# Patient Record
Sex: FEMALE | Employment: FULL TIME | URBAN - METROPOLITAN AREA
[De-identification: names, ages, dates, MRNs, and addresses within clinical notes are randomized per-mention and may not be internally consistent; named-entity substitution may affect disease eponyms.]

---

## 2023-02-21 ENCOUNTER — OFFICE VISIT (OUTPATIENT)
Dept: OBGYN CLINIC | Facility: CLINIC | Age: 43
End: 2023-02-21

## 2023-02-21 VITALS
WEIGHT: 147 LBS | BODY MASS INDEX: 26.05 KG/M2 | DIASTOLIC BLOOD PRESSURE: 70 MMHG | HEIGHT: 63 IN | SYSTOLIC BLOOD PRESSURE: 100 MMHG

## 2023-02-21 DIAGNOSIS — Z30.09 FAMILY PLANNING: Primary | ICD-10-CM

## 2023-02-21 PROCEDURE — 99202 OFFICE O/P NEW SF 15 MIN: CPT | Performed by: OBSTETRICS & GYNECOLOGY

## 2023-02-21 RX ORDER — MULTIVITAMIN
1 TABLET ORAL DAILY
COMMUNITY

## 2023-10-10 ENCOUNTER — TELEPHONE (OUTPATIENT)
Dept: OBGYN CLINIC | Facility: CLINIC | Age: 43
End: 2023-10-10

## 2023-10-10 NOTE — TELEPHONE ENCOUNTER
Patient is calling requesting to know the price of having IUD mirena insertion due to patient is self-pay. Please follow up with patient.

## 2023-10-11 NOTE — TELEPHONE ENCOUNTER
RN contacted pt, provided estimate for mirena iud insertion and office visit. Pt agrees, appt schedule with  11/10 at 1000.

## 2023-11-01 ENCOUNTER — TELEPHONE (OUTPATIENT)
Dept: OBGYN CLINIC | Facility: CLINIC | Age: 43
End: 2023-11-01

## 2023-11-01 NOTE — TELEPHONE ENCOUNTER
RN spoke to pt. Pt wants to know what the cost is for IUD removal just in case the IUD doesn't agreed with her. Pt took a Plan B at the beginning of October, and she feels like she was very mendez and had low energy and acne for weeks. She says that those symptoms have improved and she is mostly back to feeling like her normal self. Pt is wondering what the common complaints are re: IUDs. RN told pt that some people have spotting and irregular bleeding for a few months as their bodies adjust to the hormones. RN told pt that RN would look into the cost of IUD removal (pt is self-pay) and would get back to her. Pt verbalized understanding and agreed with POC.

## 2023-11-01 NOTE — TELEPHONE ENCOUNTER
Contacted [facility] Customer Service at [phone] - spoke to Betty Macario - who stated that cost of IUD Removal (CPT 41248) is estimated at $255. Patient is aware that this is only a ball park figure and not an EXACT cost. Costs could differ after billing cycle. Patient was also made aware of $131.25 self-pay amount as an established patient would be collected at the time of service. Patient is also aware that if services rendered cost more than initial self-pay amount, she may either pay the difference - or- pay as an entirely separate balance depending on how services are bill. Patient understood and agreed.

## 2023-11-01 NOTE — TELEPHONE ENCOUNTER
RN spoke to pt and answered additional questions about IUDs. RN also provided pt with [id number] so that she can look into lower-cost options. RN told pt to call the office if she has additional questions or concerns. Pt verbalized understanding and agreed with POC.

## 2023-11-08 ENCOUNTER — TELEPHONE (OUTPATIENT)
Dept: OBGYN CLINIC | Facility: CLINIC | Age: 43
End: 2023-11-08

## 2023-11-08 ENCOUNTER — OFFICE VISIT (OUTPATIENT)
Dept: OBGYN CLINIC | Facility: CLINIC | Age: 43
End: 2023-11-08

## 2023-11-08 VITALS
BODY MASS INDEX: 25.16 KG/M2 | HEIGHT: 63 IN | DIASTOLIC BLOOD PRESSURE: 60 MMHG | WEIGHT: 142 LBS | SYSTOLIC BLOOD PRESSURE: 92 MMHG

## 2023-11-08 DIAGNOSIS — Z30.430 ENCOUNTER FOR INSERTION OF INTRAUTERINE CONTRACEPTIVE DEVICE (IUD): ICD-10-CM

## 2023-11-08 DIAGNOSIS — Z12.4 SCREENING FOR CERVICAL CANCER: Primary | ICD-10-CM

## 2023-11-08 DIAGNOSIS — Z30.430 ENCOUNTER FOR IUD INSERTION: ICD-10-CM

## 2023-11-08 LAB
CONTROL LINE PRESENT WITH A CLEAR BACKGROUND (YES/NO): YES YES/NO
KIT EXPIRATION DATE: NORMAL DATE
KIT LOT #: NORMAL NUMERIC
PREGNANCY TEST, URINE: NEGATIVE

## 2023-11-08 RX ORDER — CHOLECALCIFEROL (VITAMIN D3) 50 MCG
TABLET ORAL
COMMUNITY

## 2023-11-08 NOTE — TELEPHONE ENCOUNTER
Pt is calling requesting to speak with a RN with questions on if she can work out after getting a IUD. Please assist

## 2023-11-08 NOTE — TELEPHONE ENCOUNTER
Called pt informed fine to workout if able but pt feeling cramping therefore informed to rest.  Informed also to take ibuprofen 600 mg every 6 to 8 hours as needed in between Tylenol 1,000 mg. Pt agrees.

## 2023-11-08 NOTE — PROGRESS NOTES
IUD Insertion     Pregnancy Results: UCG negative    LMP:     Consent signed. Procedure discussed with the patient in detail including indication, risks, benefits, alternatives and complications. I discussed with the patient the procedure. I discussed the normal SE of break through vaginal bleeding which could last up to 90 days as well as some short term lower abdominal cramping. I discussed with her to call me if she noticed worsening lower abdominal pain, fever, chills, nausea, vomiting, or a foul vaginal discharge. I also discussed with her how to feel for the IUD strings in her vagina to insure that the IUD had not been expelled. Procedure:  Speculum placed in the vagina. Betadine wash of vagina and cervix. Single tooth tenaculum was placed at the 12 o'clock position. Uterus sounded to 8 cm. Mirena IUD was placed without difficulty. Strings cut at 3 cm from the os. Single tooth tenaculum removed. Good hemostasis noted. Patient tolerated procedure well. Visit Plan:  IUD surveillance was discussed with the patient as above. Written info from the IUD packaging also was given to the patient at the completion of the procedure.

## 2023-11-09 ENCOUNTER — TELEPHONE (OUTPATIENT)
Dept: OBGYN CLINIC | Facility: CLINIC | Age: 43
End: 2023-11-09

## 2023-12-06 ENCOUNTER — TELEPHONE (OUTPATIENT)
Dept: OBGYN CLINIC | Facility: CLINIC | Age: 43
End: 2023-12-06

## 2023-12-06 NOTE — TELEPHONE ENCOUNTER
Patient is calling requesting to speak to RN regarding symptoms patient is having after having IUD placement. Please follow up with patient.

## 2023-12-06 NOTE — TELEPHONE ENCOUNTER
Called pt c/o bleeding x 10 days purple to red. IUD precautions given and scheduled string check for 12/13/2023. Pt agrees. Last visit 11/08/2023 [facility] [place]  Visit Plan:  IUD surveillance was discussed with the patient as above. Written info from the IUD packaging also was given to the patient at the completion of the procedure.

## 2023-12-08 ENCOUNTER — TELEPHONE (OUTPATIENT)
Dept: OBGYN CLINIC | Facility: CLINIC | Age: 43
End: 2023-12-08

## 2023-12-13 ENCOUNTER — OFFICE VISIT (OUTPATIENT)
Dept: OBGYN CLINIC | Facility: CLINIC | Age: 43
End: 2023-12-13

## 2023-12-13 VITALS — DIASTOLIC BLOOD PRESSURE: 60 MMHG | HEIGHT: 63 IN | SYSTOLIC BLOOD PRESSURE: 100 MMHG | BODY MASS INDEX: 25 KG/M2

## 2023-12-13 DIAGNOSIS — Z30.431 IUD CHECK UP: ICD-10-CM

## 2023-12-13 DIAGNOSIS — Z12.4 SCREENING FOR CERVICAL CANCER: Primary | ICD-10-CM

## 2023-12-13 PROCEDURE — 99396 PREV VISIT EST AGE 40-64: CPT | Performed by: OBSTETRICS & GYNECOLOGY

## 2023-12-13 PROCEDURE — 87624 HPV HI-RISK TYP POOLED RSLT: CPT | Performed by: OBSTETRICS & GYNECOLOGY

## 2023-12-13 RX ORDER — LEVONORGESTREL 52 MG/1
1 INTRAUTERINE DEVICE INTRAUTERINE ONCE
COMMUNITY

## 2023-12-14 LAB — HPV I/H RISK 1 DNA SPEC QL NAA+PROBE: POSITIVE

## 2023-12-19 PROBLEM — R87.810 CERVICAL HIGH RISK HUMAN PAPILLOMAVIRUS (HPV) DNA TEST POSITIVE: Status: ACTIVE | Noted: 2023-12-19

## 2024-01-18 ENCOUNTER — TELEPHONE (OUTPATIENT)
Dept: OBGYN CLINIC | Facility: CLINIC | Age: 44
End: 2024-01-18

## 2024-01-18 DIAGNOSIS — B37.9 YEAST INFECTION: Primary | ICD-10-CM

## 2024-01-18 RX ORDER — FLUCONAZOLE 150 MG/1
TABLET ORAL
Qty: 2 TABLET | Refills: 0 | Status: SHIPPED | OUTPATIENT
Start: 2024-01-18

## 2024-01-18 NOTE — TELEPHONE ENCOUNTER
RN spoke with pt. Pt says that she has had intermittent clumpy discharge and vaginal itching. Pt says that the symptoms resolved for a little while, and then returned on 1/16. Pt c/o vulva sensitivity and itching inside her vagina. RN told pt that RN would send Diflucan x2 to her preferred pharmacy (verified) and provided admin instructions. Pt is sked to have her IUD removed next week d/t migraines, frequent HA, constant breast tenderness. RN told pt that if the second dose of Diflucan didn't fix her problem, she should discuss the issue with LC at her upcoming appt. Pt verbalized understanding and agreed with POC.

## 2024-01-18 NOTE — TELEPHONE ENCOUNTER
Incoming call from patient requesting a RN to call her back for possible yeast infection .    Please assist

## 2024-01-24 ENCOUNTER — OFFICE VISIT (OUTPATIENT)
Dept: OBGYN CLINIC | Facility: CLINIC | Age: 44
End: 2024-01-24

## 2024-01-24 VITALS
SYSTOLIC BLOOD PRESSURE: 108 MMHG | WEIGHT: 142.31 LBS | HEIGHT: 63 IN | BODY MASS INDEX: 25.21 KG/M2 | DIASTOLIC BLOOD PRESSURE: 70 MMHG

## 2024-01-24 DIAGNOSIS — Z30.432 ENCOUNTER FOR REMOVAL OF INTRAUTERINE CONTRACEPTIVE DEVICE: Primary | ICD-10-CM

## 2024-01-24 PROCEDURE — 58301 REMOVE INTRAUTERINE DEVICE: CPT | Performed by: OBSTETRICS & GYNECOLOGY

## 2024-01-24 PROCEDURE — 99213 OFFICE O/P EST LOW 20 MIN: CPT | Performed by: OBSTETRICS & GYNECOLOGY

## 2024-01-24 RX ORDER — LEVONORGESTREL AND ETHINYL ESTRADIOL 0.1-0.02MG
1 KIT ORAL DAILY
Qty: 84 TABLET | Refills: 3 | Status: SHIPPED | OUTPATIENT
Start: 2024-01-24 | End: 2025-01-23

## 2024-01-24 NOTE — PROGRESS NOTES
CC: Patient is here for IUD removal.     HPI: Patient is a 43 year old  for IUD removal. Had IUD placed 2023. She does not like b/c of persistent bleeding, worsening HA.     She has used OCP's before without problems. She would like to restart OCPs        Patient's last menstrual period was 2023.    OB History    Para Term  AB Living   3 2 2   1 2   SAB IAB Ectopic Multiple Live Births           2      # Outcome Date GA Lbr Alfredo/2nd Weight Sex Delivery Anes PTL Lv   3 Term 2012   7 lb (3.175 kg) F NORMAL SPONT   [facility]A      Complications: Hemorrhage   2 AB 2004              Birth Comments: Early 1st trimester   1 Term 10/04/01 40w2d 05:00 7 lb 13 oz (3.544 kg) M Vag-Spont   CARMENCITA       GYN hx:       LPS:      History reviewed. No pertinent past medical history.  History reviewed. No pertinent surgical history.  Allergies   Allergen Reactions    Latex HIVES     Swelling per pt.     Family History   Problem Relation Age of Onset    High Blood Pressure Mother     Stroke Mother     Heart Disease Mother     No Known Problems Father     Kidney Disease Maternal Grandmother     Stroke Maternal Grandfather     Heart Disease Maternal Grandfather     Dementia Maternal Grandfather     Kidney Disease Paternal Grandmother     Diabetes Paternal Grandmother     Breast Cancer Neg     Ovarian Cancer Neg     Colon Cancer Neg      Social History     Socioeconomic History    Marital status: Single     Spouse name: Not on file    Number of children: Not on file    Years of education: Not on file    Highest education level: Not on file   Occupational History    Occupation: Accounting   Tobacco Use    Smoking status: Former     Types: Cigarettes    Smokeless tobacco: Former   Vaping Use    Vaping Use: Never used   Substance and Sexual Activity    Alcohol use: Yes     Comment: Soc.    Drug use: Never    Sexual activity: Yes     Partners: Male     Birth control/protection: Condom   Other Topics Concern      Service Not Asked    Blood Transfusions No    Caffeine Concern Not Asked    Occupational Exposure Not Asked    Hobby Hazards Not Asked    Sleep Concern Not Asked    Stress Concern Not Asked    Weight Concern Not Asked    Special Diet Not Asked    Back Care Not Asked    Exercise Not Asked    Bike Helmet Not Asked    Seat Belt Not Asked    Self-Exams Not Asked   Social History Narrative    Live with children    Feels safe    No hx of abuse     Social Determinants of Health     Financial Resource Strain: Not on file   Food Insecurity: Not on file   Transportation Needs: Not on file   Physical Activity: Not on file   Stress: Not on file   Social Connections: Not on file   Housing Stability: Not on file       Medications reviewed. See active list.     /70   Ht 63\"   Wt 142 lb 4.8 oz (64.5 kg)   LMP 11/30/2023   BMI 25.21 kg/m²       Exam:   GENERAL: well developed, well nourished, in no apparent distress  ABDOMEN: Soft, non distended; non tender, no masses.  Liver and spleen non-tender, no enlargement. No palpable hernias  GYNE/:  External Genitalia: Normal appearing, no lesions, normal hair distribution   Urethral meatus appear wnl, no abnormal discharge or lesions noted.   Bladder: well supported, urethra wnl, no palpable tenderness or masses, no discharge  Vagina: normal pink mucosa, no lesions, normal clear discharge.   Uterus: midline, mobile, non-tender, firm and smooth  Cervix: pink, no lesions grossly visible, no discharge  Adnexa: non tender, no palpable masses, normal size  Anus:  No lesions or visible hemorrhoids    IUD Removal     Consent signed.    Procedure discussed with the patient in detail including indication, risks, benefits, alternatives and complications.    Pelvic Exam Findings:  Lesion description:  IUD strings seen from cervix    Procedure:  Speculum placed in the vagina.  Speculum was used to visualize strings.  An packing forceps clamp used to grasp IUD strings.  Mirena IUD  was removed without difficulty.  The patient tolerated the procedure well.      Visit Plan:  Discharge instructions were reviewed with the patient.    A/P: Patient is 43 year old female     1. Encounter for removal of intrauterine contraceptive device  - Removal of IUD [86400]    Prior to removal I dw pt that BTB was normal for the first 90 days.   RX sent in for OCP's.     Haydee Chanel MD

## 2024-02-01 ENCOUNTER — LAB ENCOUNTER (OUTPATIENT)
Dept: LAB | Facility: REFERENCE LAB | Age: 44
End: 2024-02-01
Attending: OBSTETRICS & GYNECOLOGY

## 2024-02-01 ENCOUNTER — TELEPHONE (OUTPATIENT)
Dept: OBGYN CLINIC | Facility: CLINIC | Age: 44
End: 2024-02-01

## 2024-02-01 ENCOUNTER — NURSE ONLY (OUTPATIENT)
Dept: OBGYN CLINIC | Facility: CLINIC | Age: 44
End: 2024-02-01

## 2024-02-01 DIAGNOSIS — N39.0 URINARY TRACT INFECTION WITHOUT HEMATURIA, SITE UNSPECIFIED: ICD-10-CM

## 2024-02-01 DIAGNOSIS — Z11.3 SCREENING EXAMINATION FOR STD (SEXUALLY TRANSMITTED DISEASE): Primary | ICD-10-CM

## 2024-02-01 DIAGNOSIS — Z11.3 ROUTINE SCREENING FOR STI (SEXUALLY TRANSMITTED INFECTION): Primary | ICD-10-CM

## 2024-02-01 DIAGNOSIS — Z11.3 SCREENING EXAMINATION FOR STD (SEXUALLY TRANSMITTED DISEASE): ICD-10-CM

## 2024-02-01 LAB
BILIRUBIN: NEGATIVE
GLUCOSE (URINE DIPSTICK): NEGATIVE MG/DL
KETONES (URINE DIPSTICK): NEGATIVE MG/DL
MULTISTIX LOT#: ABNORMAL NUMERIC
NITRITE, URINE: POSITIVE
PH, URINE: 7 (ref 4.5–8)
PROTEIN (URINE DIPSTICK): NEGATIVE MG/DL
SPECIFIC GRAVITY: 1.01 (ref 1–1.03)
URINE-COLOR: YELLOW
UROBILINOGEN,SEMI-QN: 0.2 MG/DL (ref 0–1.9)

## 2024-02-01 PROCEDURE — 87491 CHLMYD TRACH DNA AMP PROBE: CPT | Performed by: OBSTETRICS & GYNECOLOGY

## 2024-02-01 PROCEDURE — 87591 N.GONORRHOEAE DNA AMP PROB: CPT | Performed by: OBSTETRICS & GYNECOLOGY

## 2024-02-01 PROCEDURE — 87186 SC STD MICRODIL/AGAR DIL: CPT | Performed by: OBSTETRICS & GYNECOLOGY

## 2024-02-01 PROCEDURE — 87389 HIV-1 AG W/HIV-1&-2 AB AG IA: CPT

## 2024-02-01 PROCEDURE — 81003 URINALYSIS AUTO W/O SCOPE: CPT | Performed by: OBSTETRICS & GYNECOLOGY

## 2024-02-01 PROCEDURE — 87088 URINE BACTERIA CULTURE: CPT | Performed by: OBSTETRICS & GYNECOLOGY

## 2024-02-01 PROCEDURE — 36415 COLL VENOUS BLD VENIPUNCTURE: CPT

## 2024-02-01 PROCEDURE — 87086 URINE CULTURE/COLONY COUNT: CPT | Performed by: OBSTETRICS & GYNECOLOGY

## 2024-02-01 PROCEDURE — 86803 HEPATITIS C AB TEST: CPT

## 2024-02-01 PROCEDURE — 86780 TREPONEMA PALLIDUM: CPT

## 2024-02-01 RX ORDER — NITROFURANTOIN 25; 75 MG/1; MG/1
100 CAPSULE ORAL 2 TIMES DAILY
Qty: 14 CAPSULE | Refills: 0 | Status: SHIPPED | OUTPATIENT
Start: 2024-02-01 | End: 2024-02-08

## 2024-02-01 NOTE — TELEPHONE ENCOUNTER
Patient is calling requesting to speak to RN regarding a medication. Per patient didn't want to stated the question just requesting a call back. Please assist.

## 2024-02-01 NOTE — TELEPHONE ENCOUNTER
RN contacted pt. Pt reports s/s of UTI that have worsened. Pt also reports recently finding out that her previous partner was sexually active with others and is requesting testing.       Labs placed. Nurse visit for UA/UC and gc/chlamydia today

## 2024-02-02 LAB
C TRACH DNA SPEC QL NAA+PROBE: NEGATIVE
HCV AB SERPL QL IA: NONREACTIVE
N GONORRHOEA DNA SPEC QL NAA+PROBE: NEGATIVE
T PALLIDUM AB SER QL IA: NONREACTIVE

## 2024-03-11 ENCOUNTER — TELEPHONE (OUTPATIENT)
Dept: OBGYN CLINIC | Facility: CLINIC | Age: 44
End: 2024-03-11

## 2024-03-11 NOTE — TELEPHONE ENCOUNTER
Incoming call from patient requesting to speak with a nurse for a possible UTI .    Patient didn't want to disclose more information to this PSR .    Please assist .

## 2024-03-11 NOTE — TELEPHONE ENCOUNTER
Called pt c/o UTI, +UTI went to clinic on Sunday given Amoxicillin.  Per pt pain in back, left side with chills, informed it was good she was seen due to possible kidney infection.  Informed antibiotic is fine and pending culture results on Wednesday.  Pt would like to follow-up with LC due to recurrent UTIs, scheduled 03/26/2024.  Pt agrees.

## 2024-03-27 ENCOUNTER — TELEPHONE (OUTPATIENT)
Dept: OBGYN CLINIC | Facility: CLINIC | Age: 44
End: 2024-03-27

## 2024-11-20 ENCOUNTER — TELEPHONE (OUTPATIENT)
Dept: OBGYN CLINIC | Facility: CLINIC | Age: 44
End: 2024-11-20

## 2024-11-20 NOTE — TELEPHONE ENCOUNTER
RN spoke with pt. Dr. Owens prescribed Lutera OCPs for the pt in Jan. 2024. Pt never started the medication. She is now planning on starting and read the supplemental material inside the package. She is concerned about taking the medication d/t family h/o stroke and blood clots. She says all her maternal family members have heart disease. Pt herself has no h/o of stroke or clots. Pt does not smoke cigarettes, but she smokes marijuana recreationally. RN told pt RN would speak to a provider and would call her back. Pt verbalized understanding and agreed with plan of care.

## 2024-11-20 NOTE — TELEPHONE ENCOUNTER
Patient is calling requesting to speak to RN regarding birthcontrol medication patient did not state more information. Please follow up with patient.     Levonorgestrel-Ethinyl Estrad (LUTERA) 0.1-20 MG-MCG Oral Tab

## 2024-11-20 NOTE — TELEPHONE ENCOUNTER
RN spoke with pt and told her that RN would forward her message to Dr. Owens. RN told pt that Dr. Owens is on call today, but she will be back in the office on Friday. Pt verbalized understanding and agreed with plan of care.

## 2024-11-22 NOTE — TELEPHONE ENCOUNTER
RN spoke with pt and told her that Dr. Owens says she can safely take Lutera. Pt verbalized understanding and agreed with plan of care.

## 2024-12-24 ENCOUNTER — OFFICE VISIT (OUTPATIENT)
Dept: OBGYN CLINIC | Facility: CLINIC | Age: 44
End: 2024-12-24
Payer: MEDICAID

## 2024-12-24 VITALS — DIASTOLIC BLOOD PRESSURE: 76 MMHG | BODY MASS INDEX: 25 KG/M2 | SYSTOLIC BLOOD PRESSURE: 108 MMHG | HEIGHT: 63 IN

## 2024-12-24 DIAGNOSIS — Z11.3 SCREEN FOR STD (SEXUALLY TRANSMITTED DISEASE): ICD-10-CM

## 2024-12-24 DIAGNOSIS — R87.810 CERVICAL HIGH RISK HUMAN PAPILLOMAVIRUS (HPV) DNA TEST POSITIVE: ICD-10-CM

## 2024-12-24 DIAGNOSIS — Z12.4 SCREENING FOR CERVICAL CANCER: Primary | ICD-10-CM

## 2024-12-24 PROCEDURE — 87491 CHLMYD TRACH DNA AMP PROBE: CPT | Performed by: OBSTETRICS & GYNECOLOGY

## 2024-12-24 PROCEDURE — 99396 PREV VISIT EST AGE 40-64: CPT | Performed by: OBSTETRICS & GYNECOLOGY

## 2024-12-24 PROCEDURE — 87591 N.GONORRHOEAE DNA AMP PROB: CPT | Performed by: OBSTETRICS & GYNECOLOGY

## 2024-12-24 PROCEDURE — 87624 HPV HI-RISK TYP POOLED RSLT: CPT | Performed by: OBSTETRICS & GYNECOLOGY

## 2024-12-24 PROCEDURE — 88175 CYTOPATH C/V AUTO FLUID REDO: CPT | Performed by: OBSTETRICS & GYNECOLOGY

## 2024-12-24 RX ORDER — ERGOCALCIFEROL 1.25 MG/1
50000 CAPSULE, LIQUID FILLED ORAL WEEKLY
COMMUNITY
Start: 2024-11-15

## 2024-12-24 RX ORDER — NAPROXEN SODIUM 220 MG/1
220 TABLET, FILM COATED ORAL
COMMUNITY
Start: 2024-12-23

## 2024-12-24 NOTE — PROGRESS NOTES
GYN H&P     2024  11:33 AM    CC: Patient is here for annual.     HPI: Patient is a 44 year old  for annual. Needs repeat pap    Menses: once a month, no heavy bleeding or pain. Plans to start Lutera      She would like STI screen      Patient's last menstrual period was 2024 (exact date).    OB History    Para Term  AB Living   3 2 2   1 2   SAB IAB Ectopic Multiple Live Births           2      # Outcome Date GA Lbr Alfredo/2nd Weight Sex Type Anes PTL Lv   3 Term 2012   7 lb (3.175 kg) F NORMAL SPONT   [facility]      Complications: Hemorrhage   2 AB 2004              Birth Comments: Early 1st trimester   1 Term 10/04/01 40w2d 05:00 7 lb 13 oz (3.544 kg) M Vag-Spont   [facility]A       GYN hx:    Hx Prior Abnormal Pap: Yes (per pt had colpo )  Pap Date: 23  Pap Result Notes: HPV+  Follow Up Recommendation: mammo done 2024      History reviewed. No pertinent past medical history.  History reviewed. No pertinent surgical history.  Allergies[1]  Family History   Problem Relation Age of Onset    High Blood Pressure Mother     Stroke Mother     Heart Disease Mother     No Known Problems Father     Kidney Disease Maternal Grandmother     Stroke Maternal Grandfather     Heart Disease Maternal Grandfather     Dementia Maternal Grandfather     Kidney Disease Paternal Grandmother     Diabetes Paternal Grandmother     Breast Cancer Neg     Ovarian Cancer Neg     Colon Cancer Neg      Social History     Socioeconomic History    Marital status: Single   Occupational History    Occupation: Accounting   Tobacco Use    Smoking status: Former     Types: Cigarettes    Smokeless tobacco: Former   Vaping Use    Vaping status: Never Used   Substance and Sexual Activity    Alcohol use: Yes     Comment: Soc.    Drug use: Never    Sexual activity: Yes     Partners: Male     Birth control/protection: Condom     Social History     Social History Narrative    Live with children    Feels safe    No hx of abuse        Medications reviewed. See active list.     /76   Ht 63\"   LMP 12/04/2024 (Exact Date)   BMI 25.21 kg/m²       Exam:   GENERAL: well developed, well nourished, in no apparent distress  SKIN: no rashes, no suspicious lesions  HEENT: normal  NECK: supple; no thyroidmegaly, no adenopathy  BREASTS: symmetrical, nontender, no palpable masses or nodes, no nipple discharge, no skin changes, no dippling, no palpable axillary adenopathy  ABDOMEN: Soft, non distended; non tender, no masses.  Liver and spleen non-tender, no enlargement. No palpable hernias  GYNE/:  External Genitalia: Normal appearing, no lesions, normal hair distribution   Urethral meatus appear wnl, no abnormal discharge or lesions noted.   Bladder: well supported, urethra wnl, no palpable tenderness or masses, no discharge  Vagina: normal pink mucosa, no lesions, normal clear discharge.   Uterus: midline, mobile, non-tender, firm and smooth  Cervix: pink, no lesions grossly visible, no discharge  Adnexa: non tender, no palpable masses, normal size  Anus:  No lesions or visible hemorrhoids        A/P: Patient is 44 year old female     1. Screening for cervical cancer  - ThinPrep PAP Smear; Future  - Hpv Dna  High Risk , Thin Prep Collect; Future    2. Cervical high risk human papillomavirus (HPV) DNA test positive  - ThinPrep PAP Smear; Future  - Hpv Dna  High Risk , Thin Prep Collect; Future    3. Screen for STD (sexually transmitted disease)  - HIV Ag/Ab Combo; Future  - Hepatitis B Surface Antigen; Future  - HCV Antibody; Future  - T Pallidum Screening [place]; Future      Haydee Chanel MD              [1]   Allergies  Allergen Reactions    Latex HIVES     Swelling per pt.

## 2024-12-26 ENCOUNTER — LAB ENCOUNTER (OUTPATIENT)
Dept: LAB | Facility: REFERENCE LAB | Age: 44
End: 2024-12-26
Attending: OBSTETRICS & GYNECOLOGY
Payer: MEDICAID

## 2024-12-26 DIAGNOSIS — Z11.3 SCREEN FOR STD (SEXUALLY TRANSMITTED DISEASE): ICD-10-CM

## 2024-12-26 DIAGNOSIS — Z11.3 SCREENING EXAMINATION FOR STD (SEXUALLY TRANSMITTED DISEASE): ICD-10-CM

## 2024-12-26 LAB
C TRACH DNA SPEC QL NAA+PROBE: NEGATIVE
HBV SURFACE AG SER-ACNC: <0.1 [IU]/L
HBV SURFACE AG SERPL QL IA: NONREACTIVE
HCV AB SERPL QL IA: NONREACTIVE
HPV E6+E7 MRNA CVX QL NAA+PROBE: NEGATIVE
N GONORRHOEA DNA SPEC QL NAA+PROBE: NEGATIVE
T PALLIDUM AB SER QL IA: NONREACTIVE

## 2024-12-26 PROCEDURE — 86803 HEPATITIS C AB TEST: CPT

## 2024-12-26 PROCEDURE — 86780 TREPONEMA PALLIDUM: CPT

## 2024-12-26 PROCEDURE — 87340 HEPATITIS B SURFACE AG IA: CPT

## 2024-12-26 PROCEDURE — 87389 HIV-1 AG W/HIV-1&-2 AB AG IA: CPT

## 2024-12-26 PROCEDURE — 87491 CHLMYD TRACH DNA AMP PROBE: CPT

## 2024-12-26 PROCEDURE — 87591 N.GONORRHOEAE DNA AMP PROB: CPT

## 2024-12-26 PROCEDURE — 36415 COLL VENOUS BLD VENIPUNCTURE: CPT

## 2024-12-27 LAB
C TRACH DNA SPEC QL NAA+PROBE: NEGATIVE
N GONORRHOEA DNA SPEC QL NAA+PROBE: NEGATIVE

## 2025-02-26 ENCOUNTER — OFFICE VISIT (OUTPATIENT)
Dept: OBGYN CLINIC | Facility: CLINIC | Age: 45
End: 2025-02-26
Payer: MEDICAID

## 2025-02-26 VITALS
HEIGHT: 63 IN | DIASTOLIC BLOOD PRESSURE: 70 MMHG | BODY MASS INDEX: 23.92 KG/M2 | SYSTOLIC BLOOD PRESSURE: 118 MMHG | WEIGHT: 135 LBS

## 2025-02-26 DIAGNOSIS — R35.0 URINARY FREQUENCY: ICD-10-CM

## 2025-02-26 DIAGNOSIS — N94.10 DYSPAREUNIA IN FEMALE: Primary | ICD-10-CM

## 2025-02-26 LAB
APPEARANCE: CLEAR
BILIRUBIN: NEGATIVE
GLUCOSE (URINE DIPSTICK): NEGATIVE MG/DL
KETONES (URINE DIPSTICK): 15 MG/DL
LEUKOCYTES: NEGATIVE
MULTISTIX LOT#: ABNORMAL NUMERIC
NITRITE, URINE: NEGATIVE
PH, URINE: 6 (ref 4.5–8)
PROTEIN (URINE DIPSTICK): NEGATIVE MG/DL
SPECIFIC GRAVITY: 1.01 (ref 1–1.03)
URINE-COLOR: YELLOW
UROBILINOGEN,SEMI-QN: 0.2 MG/DL (ref 0–1.9)

## 2025-02-26 PROCEDURE — 87186 SC STD MICRODIL/AGAR DIL: CPT | Performed by: OBSTETRICS & GYNECOLOGY

## 2025-02-26 PROCEDURE — 87077 CULTURE AEROBIC IDENTIFY: CPT | Performed by: OBSTETRICS & GYNECOLOGY

## 2025-02-26 PROCEDURE — 81002 URINALYSIS NONAUTO W/O SCOPE: CPT | Performed by: OBSTETRICS & GYNECOLOGY

## 2025-02-26 PROCEDURE — 87086 URINE CULTURE/COLONY COUNT: CPT | Performed by: OBSTETRICS & GYNECOLOGY

## 2025-02-26 PROCEDURE — 99214 OFFICE O/P EST MOD 30 MIN: CPT | Performed by: OBSTETRICS & GYNECOLOGY

## 2025-02-26 NOTE — PROGRESS NOTES
CC: Patient is here for pelvic pain    HPI: Patient is a 44 year old  for pelvic pain with sex    + c/o pain with sex with some bleeding x 1.5 years,  Pain is upon entry,not deep, not every time with external redness after sex. She does not feel like this is due to lack of lubrication. She tried astroglide with no relief. After she has sex she feels \"pinching sensation\" in her vagina. She will continue to have vaginal pain for up to a week after sex.     She has been having difficulty sleeping at night with some night sweats. No skipped periods    She is using condoms. She was given a prescription for OCP's but has not started them.     Menses: 1 x per month, usually last for 4 days, last period was very heavy, lots of PMS, lasted 7 - 8 days with lower back pain with periods. She has difficulty working out prior to her period.     She does have chronic urinary frequency, thinks she has UTI a lot but does not have one. No pain on urination.     No c/o vaginal discharge.     10/24 pelvic usn @ [place] was normal.       Patient's last menstrual period was 2025 (exact date).    OB History    Para Term  AB Living   3 2 2   1 2   SAB IAB Ectopic Multiple Live Births           2      # Outcome Date GA Lbr Alfredo/2nd Weight Sex Type Anes PTL Lv   3 Term 2012   7 lb (3.175 kg) F NORMAL SPONT   [facility]A      Complications: Hemorrhage   2 AB 2004              Birth Comments: Early 1st trimester   1 Term 10/04/01 40w2d 05:00 7 lb 13 oz (3.544 kg) M Vag-Spont   [facility]A       GYN hx:    Hx Prior Abnormal Pap: Yes (per pt had colpo )  Pap Date: 24  Pap Result Notes: wnl  Follow Up Recommendation: mammo done 2024  LPS:      C    Past Medical History:    Osteoarthritis     No past surgical history on file.  Allergies[1]  Family History   Problem Relation Age of Onset    High Blood Pressure Mother     Stroke Mother     Heart Disease Mother     No Known Problems Father     Kidney Disease Maternal  Grandmother     Stroke Maternal Grandfather     Heart Disease Maternal Grandfather     Dementia Maternal Grandfather     Kidney Disease Paternal Grandmother     Diabetes Paternal Grandmother     Breast Cancer Neg     Ovarian Cancer Neg     Colon Cancer Neg      Social History     Socioeconomic History    Marital status: Single     Spouse name: Not on file    Number of children: Not on file    Years of education: Not on file    Highest education level: Not on file   Occupational History    Occupation: Accounting   Tobacco Use    Smoking status: Former     Types: Cigarettes    Smokeless tobacco: Former   Vaping Use    Vaping status: Never Used   Substance and Sexual Activity    Alcohol use: Yes     Comment: Soc.    Drug use: Never    Sexual activity: Yes     Partners: Male     Birth control/protection: Condom   Other Topics Concern     Service Not Asked    Blood Transfusions No    Caffeine Concern Not Asked    Occupational Exposure Not Asked    Hobby Hazards Not Asked    Sleep Concern Not Asked    Stress Concern Not Asked    Weight Concern Not Asked    Special Diet Not Asked    Back Care Not Asked    Exercise Not Asked    Bike Helmet Not Asked    Seat Belt Not Asked    Self-Exams Not Asked   Social History Narrative    Live with children    Feels safe    No hx of abuse     Social Drivers of Health     Food Insecurity: Patient Declined (10/25/2024)    Received from [facility]    Hunger Vital Sign     Worried About Running Out of Food in the Last Year: Patient declined     Ran Out of Food in the Last Year: Patient declined   Transportation Needs: Patient Declined (10/25/2024)    Received from [facility]    PRAPARE - Transportation     Lack of Transportation (Medical): Patient declined     Lack of Transportation (Non-Medical): Patient declined   Stress: Not on file   Housing Stability: Unknown (10/25/2024)    Received from [facility]    Housing  Stability Vital Sign     Unable to Pay for Housing in the Last Year: Patient declined     Number of Times Moved in the Last Year: Not on file     Homeless in the Last Year: Not on file       Medications reviewed. See active list.     /70   Ht 63\"   Wt 135 lb (61.2 kg)   LMP 02/13/2025 (Exact Date)   BMI 23.91 kg/m²       Exam:   GENERAL: well developed, well nourished, in no apparent distress    A/P: Patient is 44 year old female     1. Dyspareunia in female  - Physical Therapy Referral - External    2. Urinary frequency  - Urogynecology Referral - In Network    Refer to PT for pelvic floor PT  DW her that she may have interstitial cystitis which is worsened by sex. Recommend see urogynecologist.   Recommend start OCPs to see if it helps with her pelvic pain.       Haydee Chanel MD                [1]   Allergies  Allergen Reactions    Latex HIVES     Swelling per pt.

## 2025-02-27 RX ORDER — CEPHALEXIN 500 MG/1
500 CAPSULE ORAL 4 TIMES DAILY
Qty: 28 CAPSULE | Refills: 0 | Status: SHIPPED | OUTPATIENT
Start: 2025-02-27

## 2025-02-28 ENCOUNTER — TELEPHONE (OUTPATIENT)
Dept: OBGYN CLINIC | Facility: CLINIC | Age: 45
End: 2025-02-28

## 2025-02-28 RX ORDER — FLUCONAZOLE 150 MG/1
TABLET ORAL
Qty: 2 TABLET | Refills: 0 | Status: SHIPPED | OUTPATIENT
Start: 2025-02-28

## 2025-02-28 NOTE — TELEPHONE ENCOUNTER
Incoming call from patient requesting to speak with doctor if she cn be seen today in regards to her symptoms. Patient states she is still in pain. And is very uncomfortable.     Please assist.

## 2025-02-28 NOTE — TELEPHONE ENCOUNTER
Called pt c/o pain around vagina, same pain, more sensitive with burning, swollen denies white cottage cheese discharge.  Pt unable to sit.  Per pt did not remove under garments for detailed evaluation due to pain at last visit.  Due to description and taking antibiotics Diflucan order placed.  Explained use but also informed pt after first dose if no improvement go to IC for further evaluation.  Pt agrees.    Last visit 02/26/2025 Dr. Owens    A/P: Patient is 44 year old female      1. Dyspareunia in female  - Physical Therapy Referral - External     2. Urinary frequency  - Urogynecology Referral - In Network     Refer to PT for pelvic floor PT  DW her that she may have interstitial cystitis which is worsened by sex. Recommend see urogynecologist.   Recommend start OCPs to see if it helps with her pelvic pain.     Haydee Chanel MD

## 2025-05-16 ENCOUNTER — TELEPHONE (OUTPATIENT)
Dept: OBGYN CLINIC | Facility: CLINIC | Age: 45
End: 2025-05-16

## 2025-05-16 ENCOUNTER — NURSE ONLY (OUTPATIENT)
Dept: OBGYN CLINIC | Facility: CLINIC | Age: 45
End: 2025-05-16
Payer: MEDICAID

## 2025-05-16 DIAGNOSIS — R39.9 UTI SYMPTOMS: Primary | ICD-10-CM

## 2025-05-16 LAB
APPEARANCE: CLEAR
BILIRUBIN: NEGATIVE
GLUCOSE (URINE DIPSTICK): NEGATIVE MG/DL
KETONES (URINE DIPSTICK): NEGATIVE MG/DL
LEUKOCYTES: NEGATIVE
MULTISTIX LOT#: ABNORMAL NUMERIC
NITRITE, URINE: NEGATIVE
PH, URINE: 6 (ref 4.5–8)
PROTEIN (URINE DIPSTICK): NEGATIVE MG/DL
SPECIFIC GRAVITY: 1.01 (ref 1–1.03)
URINE-COLOR: YELLOW
UROBILINOGEN,SEMI-QN: 0.2 MG/DL (ref 0–1.9)

## 2025-05-16 PROCEDURE — 81002 URINALYSIS NONAUTO W/O SCOPE: CPT | Performed by: OBSTETRICS & GYNECOLOGY

## 2025-05-16 NOTE — PROGRESS NOTES
Patient present UA/UC nurse visit. Reports symptoms for burning with urination, frequency and general discomfort. Provided patient collection instruction. Urine analysis reviewed with Dr. Schmidt. No treatment indicated. Called patient and notified. PT verbalized understanding

## 2025-05-16 NOTE — TELEPHONE ENCOUNTER
Received a call from patient to inform she has UTI symptoms and will like to get tested  .    Please assist .

## 2025-05-22 ENCOUNTER — PATIENT MESSAGE (OUTPATIENT)
Dept: OBGYN CLINIC | Facility: CLINIC | Age: 45
End: 2025-05-22